# Patient Record
Sex: MALE | Race: WHITE | Employment: PART TIME | URBAN - METROPOLITAN AREA
[De-identification: names, ages, dates, MRNs, and addresses within clinical notes are randomized per-mention and may not be internally consistent; named-entity substitution may affect disease eponyms.]

---

## 2023-05-05 ENCOUNTER — HOSPITAL ENCOUNTER (EMERGENCY)
Age: 63
Discharge: HOME | End: 2023-05-05
Payer: COMMERCIAL

## 2023-05-05 VITALS
SYSTOLIC BLOOD PRESSURE: 157 MMHG | TEMPERATURE: 97.3 F | OXYGEN SATURATION: 96 % | HEART RATE: 77 BPM | DIASTOLIC BLOOD PRESSURE: 95 MMHG | RESPIRATION RATE: 16 BRPM

## 2023-05-05 DIAGNOSIS — S61.012A LACERATION OF LEFT THUMB WITHOUT FOREIGN BODY WITHOUT DAMAGE TO NAIL, INITIAL ENCOUNTER: Primary | ICD-10-CM

## 2023-05-05 PROCEDURE — 99282 EMERGENCY DEPT VISIT SF MDM: CPT

## 2023-05-05 PROCEDURE — 2500000003 HC RX 250 WO HCPCS: Performed by: NURSE PRACTITIONER

## 2023-05-05 PROCEDURE — 12001 RPR S/N/AX/GEN/TRNK 2.5CM/<: CPT

## 2023-05-05 RX ORDER — LIDOCAINE HYDROCHLORIDE 10 MG/ML
5 INJECTION, SOLUTION INFILTRATION; PERINEURAL ONCE
Status: COMPLETED | OUTPATIENT
Start: 2023-05-05 | End: 2023-05-05

## 2023-05-05 RX ADMIN — LIDOCAINE HYDROCHLORIDE 5 ML: 10 INJECTION, SOLUTION INFILTRATION; PERINEURAL at 09:52

## 2023-05-05 NOTE — DISCHARGE INSTRUCTIONS
Stitches can be removed in 7 days. You may go to your PCP, Urgent Care or the ER for removal.  Monitor for signs of infection such as drainage, redness, swelling, fevers. If you develop these, return to the ER. Keep the area clean and dry.

## 2023-05-05 NOTE — ED PROVIDER NOTES
[facility] Visit. [facility]  Department of Emergency Medicine   ED  Encounter Note  Admit Date/RoomTime: 2023  9:06 AM  ED Room: [address]    NAME: Franklyn Ramos  : 1960  MRN: [id number]     Chief Complaint:  Hand Injury and Laceration (Pt states cut hand with )    History of Present Illness        Franklyn Ramos is a 63 y.o. old male presenting to the emergency department by private vehicle, for traumatic Left Thumb pain which occured 1 hour(s) prior to arrival. Stated he cut his left thumb while using a carballo knife. No other injuries. Thumb is neurovascularly intact, no loss of movement/sensation. Tetanus Status: within past 5 years. ROS   Pertinent positives and negatives are stated within HPI, all other systems reviewed and are negative. Past Medical History:  has no past medical history on file. Surgical History:  has no past surgical history on file. Social History:      Family History: family history is not on file. Allergies: Sulfa antibiotics    Physical Exam   Oxygen Saturation Interpretation: Normal.        ED Triage Vitals [23 0853]   BP Temp Temp Source Pulse Resp SpO2 Height Weight   -- 97.3 °F (36.3 °C) Temporal 77 -- 96 % -- --         Constitutional:  Alert, development consistent with age. Neck:  Normal ROM. Supple. Non-tender. Respiratory: Respirations regular, nonlabored, no tachypnea. Fingers:  Left Thumb             Tenderness:  none. Swelling: None. Deformity: no deformity observed/palpated. ROM: full range of motion. Skin:  a laceration located on the proximal aspect, approx 1cm in length. Neurovascular: Motor deficit: none. Sensory deficit:   none. Pulse deficit: none. Capillary refill: normal.  Left Hand: all metacarpals. Tenderness: none. Swelling: None.

## 2025-06-07 ENCOUNTER — HOSPITAL ENCOUNTER (OUTPATIENT)
Dept: GENERAL RADIOLOGY | Age: 65
Discharge: HOME | End: 2025-06-09
Payer: COMMERCIAL

## 2025-06-07 ENCOUNTER — HOSPITAL ENCOUNTER (OUTPATIENT)
Age: 65
Discharge: HOME | End: 2025-06-09
Payer: COMMERCIAL

## 2025-06-07 DIAGNOSIS — R52 PAIN: ICD-10-CM

## 2025-06-07 PROCEDURE — 73630 X-RAY EXAM OF FOOT: CPT
